# Patient Record
Sex: MALE | Race: WHITE | Employment: UNEMPLOYED | URBAN - METROPOLITAN AREA
[De-identification: names, ages, dates, MRNs, and addresses within clinical notes are randomized per-mention and may not be internally consistent; named-entity substitution may affect disease eponyms.]

---

## 2017-01-01 ENCOUNTER — OFFICE VISIT (OUTPATIENT)
Dept: ENT CLINIC | Age: 67
End: 2017-01-01

## 2017-01-01 ENCOUNTER — TELEPHONE (OUTPATIENT)
Dept: ENT CLINIC | Age: 67
End: 2017-01-01

## 2017-01-01 ENCOUNTER — HOSPITAL ENCOUNTER (OUTPATIENT)
Dept: SURGERY | Age: 67
Discharge: HOME | End: 2017-06-12
Attending: OTOLARYNGOLOGY | Admitting: OTOLARYNGOLOGY

## 2017-01-01 ENCOUNTER — HOSPITAL ENCOUNTER (OUTPATIENT)
Dept: CT IMAGING | Age: 67
Discharge: HOME | End: 2017-11-29
Admitting: OTOLARYNGOLOGY

## 2017-01-01 ENCOUNTER — HOSPITAL ENCOUNTER (OUTPATIENT)
Dept: OTHER | Age: 67
Discharge: HOME | End: 2017-12-13
Attending: INTERNAL MEDICINE | Admitting: INTERNAL MEDICINE

## 2017-01-01 ENCOUNTER — PROCEDURE VISIT (OUTPATIENT)
Dept: VASCULAR SURGERY | Age: 67
End: 2017-01-01

## 2017-01-01 ENCOUNTER — TELEPHONE (OUTPATIENT)
Dept: CARDIOTHORACIC SURGERY | Age: 67
End: 2017-01-01

## 2017-01-01 VITALS
HEIGHT: 66 IN | HEART RATE: 72 BPM | BODY MASS INDEX: 21.21 KG/M2 | DIASTOLIC BLOOD PRESSURE: 56 MMHG | WEIGHT: 132 LBS | SYSTOLIC BLOOD PRESSURE: 104 MMHG

## 2017-01-01 VITALS
WEIGHT: 142.19 LBS | BODY MASS INDEX: 22.32 KG/M2 | HEART RATE: 75 BPM | RESPIRATION RATE: 16 BRPM | SYSTOLIC BLOOD PRESSURE: 118 MMHG | OXYGEN SATURATION: 97 % | TEMPERATURE: 98 F | DIASTOLIC BLOOD PRESSURE: 72 MMHG | HEIGHT: 67 IN

## 2017-01-01 VITALS — BODY MASS INDEX: 22.29 KG/M2 | WEIGHT: 142 LBS | HEIGHT: 67 IN

## 2017-01-01 VITALS
HEIGHT: 67 IN | HEART RATE: 57 BPM | DIASTOLIC BLOOD PRESSURE: 63 MMHG | WEIGHT: 144.8 LBS | BODY MASS INDEX: 22.73 KG/M2 | SYSTOLIC BLOOD PRESSURE: 112 MMHG

## 2017-01-01 VITALS — HEIGHT: 67 IN | WEIGHT: 144 LBS | BODY MASS INDEX: 22.6 KG/M2

## 2017-01-01 DIAGNOSIS — C44.329 SQUAMOUS CELL CARCINOMA OF SKIN OF CHEEK: Primary | ICD-10-CM

## 2017-01-01 DIAGNOSIS — R63.4 WEIGHT LOSS, NON-INTENTIONAL: ICD-10-CM

## 2017-01-01 DIAGNOSIS — I65.23 BILATERAL CAROTID ARTERY STENOSIS: Primary | ICD-10-CM

## 2017-01-01 DIAGNOSIS — R63.4 ABNORMAL WEIGHT LOSS: ICD-10-CM

## 2017-01-01 DIAGNOSIS — K90.9 MALABSORPTION OF IRON: ICD-10-CM

## 2017-01-01 DIAGNOSIS — R13.12 OROPHARYNGEAL DYSPHAGIA: Primary | ICD-10-CM

## 2017-01-01 DIAGNOSIS — C44.90 PRIMARY SKIN MALIGNANCY: Primary | ICD-10-CM

## 2017-01-01 DIAGNOSIS — R13.12 OROPHARYNGEAL DYSPHAGIA: ICD-10-CM

## 2017-01-01 DIAGNOSIS — T45.1X5A ANTINEOPLASTIC CHEMOTHERAPY INDUCED ANEMIA: ICD-10-CM

## 2017-01-01 DIAGNOSIS — C44.90 PRIMARY SKIN MALIGNANCY: ICD-10-CM

## 2017-01-01 DIAGNOSIS — D64.81 ANTINEOPLASTIC CHEMOTHERAPY INDUCED ANEMIA: ICD-10-CM

## 2017-01-01 LAB
ABO/RH: NORMAL
ABO/RH: NORMAL
ANION GAP SERPL CALCULATED.3IONS-SCNC: 8 MMOL/L (ref 3–16)
ANISOCYTOSIS: ABNORMAL
ANTIBODY IDENTIFICATION: NORMAL
ANTIBODY SCREEN: NORMAL
ANTIBODY SCREEN: NORMAL
BASOPHILS ABSOLUTE: 0 K/UL (ref 0–0.2)
BASOPHILS RELATIVE PERCENT: 0.5 %
BLOOD BANK DISPENSE STATUS: NORMAL
BLOOD BANK PRODUCT CODE: NORMAL
BPU ID: NORMAL
BUN BLDV-MCNC: 13 MG/DL (ref 7–20)
CALCIUM SERPL-MCNC: 9.3 MG/DL (ref 8.3–10.6)
CHLORIDE BLD-SCNC: 101 MMOL/L (ref 99–110)
CO2: 30 MMOL/L (ref 21–32)
CREAT SERPL-MCNC: 0.7 MG/DL (ref 0.8–1.3)
DAT IGG CAPTURE: NORMAL
DESCRIPTION BLOOD BANK: NORMAL
EOSINOPHILS ABSOLUTE: 0.1 K/UL (ref 0–0.6)
EOSINOPHILS RELATIVE PERCENT: 3 %
GFR AFRICAN AMERICAN: >60
GFR NON-AFRICAN AMERICAN: >60
GLUCOSE BLD-MCNC: 135 MG/DL (ref 70–99)
GLUCOSE BLD-MCNC: 97 MG/DL (ref 70–99)
HCT VFR BLD CALC: 31.2 % (ref 40.5–52.5)
HCT VFR BLD CALC: 32.7 % (ref 40.5–52.5)
HEMATOLOGY PATH CONSULT: NORMAL
HEMATOLOGY PATH CONSULT: NORMAL
HEMATOLOGY PATH CONSULT: YES
HEMOGLOBIN: 10.6 G/DL (ref 13.5–17.5)
HEMOGLOBIN: 11 G/DL (ref 13.5–17.5)
INR BLD: 1.04 (ref 0.85–1.15)
LYMPHOCYTES ABSOLUTE: 0.1 K/UL (ref 1–5.1)
LYMPHOCYTES RELATIVE PERCENT: 3.2 %
MACROCYTES: ABNORMAL
MCH RBC QN AUTO: 37.8 PG (ref 26–34)
MCH RBC QN AUTO: 39 PG (ref 26–34)
MCHC RBC AUTO-ENTMCNC: 33.7 G/DL (ref 31–36)
MCHC RBC AUTO-ENTMCNC: 33.9 G/DL (ref 31–36)
MCV RBC AUTO: 112.3 FL (ref 80–100)
MCV RBC AUTO: 114.9 FL (ref 80–100)
MONOCYTES ABSOLUTE: 0.2 K/UL (ref 0–1.3)
MONOCYTES RELATIVE PERCENT: 6.3 %
NEUTROPHILS ABSOLUTE: 3 K/UL (ref 1.7–7.7)
NEUTROPHILS RELATIVE PERCENT: 87 %
OVALOCYTES: ABNORMAL
PDW BLD-RTO: 16.6 % (ref 12.4–15.4)
PDW BLD-RTO: 18.1 % (ref 12.4–15.4)
PERFORMED ON: ABNORMAL
PLATELET # BLD: 67 K/UL (ref 135–450)
PLATELET # BLD: 88 K/UL (ref 135–450)
PLATELET SLIDE REVIEW: ABNORMAL
PMV BLD AUTO: 8 FL (ref 5–10.5)
PMV BLD AUTO: 9 FL (ref 5–10.5)
POTASSIUM SERPL-SCNC: 4.3 MMOL/L (ref 3.5–5.1)
PROTHROMBIN TIME: 11.8 SEC (ref 9.6–13)
RBC # BLD: 2.71 M/UL (ref 4.2–5.9)
RBC # BLD: 2.91 M/UL (ref 4.2–5.9)
SLIDE REVIEW: ABNORMAL
SODIUM BLD-SCNC: 139 MMOL/L (ref 136–145)
TEAR DROP CELLS: ABNORMAL
WBC # BLD: 2.4 K/UL (ref 4–11)
WBC # BLD: 3.5 K/UL (ref 4–11)

## 2017-01-01 PROCEDURE — 14040 TIS TRNFR F/C/C/M/N/A/G/H/F: CPT | Performed by: OTOLARYNGOLOGY

## 2017-01-01 PROCEDURE — 99024 POSTOP FOLLOW-UP VISIT: CPT | Performed by: OTOLARYNGOLOGY

## 2017-01-01 PROCEDURE — 93880 EXTRACRANIAL BILAT STUDY: CPT | Performed by: SURGERY

## 2017-01-01 PROCEDURE — 43450 DILATE ESOPHAGUS 1/MULT PASS: CPT | Performed by: OTOLARYNGOLOGY

## 2017-01-01 PROCEDURE — 99213 OFFICE O/P EST LOW 20 MIN: CPT | Performed by: OTOLARYNGOLOGY

## 2017-01-01 RX ORDER — SODIUM CHLORIDE 0.9 % (FLUSH) 0.9 %
10 SYRINGE (ML) INJECTION EVERY 12 HOURS SCHEDULED
Status: DISCONTINUED | OUTPATIENT
Start: 2017-01-01 | End: 2017-01-01 | Stop reason: HOSPADM

## 2017-01-01 RX ORDER — SODIUM CHLORIDE, SODIUM LACTATE, POTASSIUM CHLORIDE, CALCIUM CHLORIDE 600; 310; 30; 20 MG/100ML; MG/100ML; MG/100ML; MG/100ML
INJECTION, SOLUTION INTRAVENOUS CONTINUOUS
Status: DISCONTINUED | OUTPATIENT
Start: 2017-01-01 | End: 2017-01-01 | Stop reason: HOSPADM

## 2017-01-01 RX ORDER — MEPERIDINE HYDROCHLORIDE 25 MG/ML
12.5 INJECTION INTRAMUSCULAR; INTRAVENOUS; SUBCUTANEOUS EVERY 5 MIN PRN
Status: DISCONTINUED | OUTPATIENT
Start: 2017-01-01 | End: 2017-01-01 | Stop reason: HOSPADM

## 2017-01-01 RX ORDER — HYDROMORPHONE HCL 110MG/55ML
0.25 PATIENT CONTROLLED ANALGESIA SYRINGE INTRAVENOUS EVERY 5 MIN PRN
Status: DISCONTINUED | OUTPATIENT
Start: 2017-01-01 | End: 2017-01-01 | Stop reason: HOSPADM

## 2017-01-01 RX ORDER — ONDANSETRON 2 MG/ML
4 INJECTION INTRAMUSCULAR; INTRAVENOUS EVERY 6 HOURS PRN
Status: DISCONTINUED | OUTPATIENT
Start: 2017-01-01 | End: 2017-01-01 | Stop reason: HOSPADM

## 2017-01-01 RX ORDER — ONDANSETRON 2 MG/ML
4 INJECTION INTRAMUSCULAR; INTRAVENOUS
Status: ACTIVE | OUTPATIENT
Start: 2017-01-01 | End: 2017-01-01

## 2017-01-01 RX ORDER — SODIUM CHLORIDE 0.9 % (FLUSH) 0.9 %
10 SYRINGE (ML) INJECTION PRN
Status: DISCONTINUED | OUTPATIENT
Start: 2017-01-01 | End: 2017-01-01 | Stop reason: HOSPADM

## 2017-01-01 RX ORDER — TRAMADOL HYDROCHLORIDE 50 MG/1
50 TABLET ORAL EVERY 6 HOURS PRN
Qty: 15 TABLET | Refills: 0 | Status: SHIPPED | OUTPATIENT
Start: 2017-01-01 | End: 2017-01-01 | Stop reason: ALTCHOICE

## 2017-01-01 RX ORDER — HYDROMORPHONE HCL 110MG/55ML
0.5 PATIENT CONTROLLED ANALGESIA SYRINGE INTRAVENOUS EVERY 5 MIN PRN
Status: DISCONTINUED | OUTPATIENT
Start: 2017-01-01 | End: 2017-01-01 | Stop reason: HOSPADM

## 2017-01-01 RX ORDER — SODIUM CHLORIDE 9 MG/ML
INJECTION, SOLUTION INTRAVENOUS CONTINUOUS
Status: DISCONTINUED | OUTPATIENT
Start: 2017-01-01 | End: 2017-01-01 | Stop reason: HOSPADM

## 2017-01-01 RX ORDER — LIDOCAINE HYDROCHLORIDE 10 MG/ML
1 INJECTION, SOLUTION EPIDURAL; INFILTRATION; INTRACAUDAL; PERINEURAL
Status: ACTIVE | OUTPATIENT
Start: 2017-01-01 | End: 2017-01-01

## 2017-01-01 RX ORDER — LIDOCAINE HYDROCHLORIDE 10 MG/ML
0.5 INJECTION, SOLUTION EPIDURAL; INFILTRATION; INTRACAUDAL; PERINEURAL ONCE
Status: DISCONTINUED | OUTPATIENT
Start: 2017-01-01 | End: 2017-01-01 | Stop reason: HOSPADM

## 2017-01-01 RX ORDER — ACETAMINOPHEN 325 MG/1
TABLET ORAL
Status: COMPLETED
Start: 2017-01-01 | End: 2017-01-01

## 2017-01-01 RX ORDER — ACETAMINOPHEN 325 MG/1
650 TABLET ORAL EVERY 4 HOURS PRN
Status: DISCONTINUED | OUTPATIENT
Start: 2017-01-01 | End: 2017-01-01 | Stop reason: HOSPADM

## 2017-01-01 RX ORDER — CEFDINIR 300 MG/1
300 CAPSULE ORAL 2 TIMES DAILY
Qty: 14 CAPSULE | Refills: 0 | Status: SHIPPED | OUTPATIENT
Start: 2017-01-01 | End: 2017-01-01 | Stop reason: ALTCHOICE

## 2017-01-01 RX ADMIN — SODIUM CHLORIDE: 9 INJECTION, SOLUTION INTRAVENOUS at 12:01

## 2017-01-01 RX ADMIN — ACETAMINOPHEN 650 MG: 325 TABLET ORAL at 12:08

## 2017-01-01 RX ADMIN — SODIUM CHLORIDE: 9 INJECTION, SOLUTION INTRAVENOUS at 07:00

## 2017-01-01 ASSESSMENT — PAIN SCALES - GENERAL: PAINLEVEL_OUTOF10: 3

## 2017-01-01 ASSESSMENT — PAIN - FUNCTIONAL ASSESSMENT
PAIN_FUNCTIONAL_ASSESSMENT: 0-10
PAIN_FUNCTIONAL_ASSESSMENT: 0-10

## 2017-01-01 ASSESSMENT — PAIN DESCRIPTION - DESCRIPTORS: DESCRIPTORS: ACHING

## 2017-01-01 ASSESSMENT — PAIN DESCRIPTION - LOCATION: LOCATION: HEAD

## 2017-04-14 PROBLEM — D64.81 ANTINEOPLASTIC CHEMOTHERAPY INDUCED ANEMIA: Status: ACTIVE | Noted: 2017-04-14

## 2017-04-14 PROBLEM — T45.1X5A ANTINEOPLASTIC CHEMOTHERAPY INDUCED ANEMIA: Status: ACTIVE | Noted: 2017-04-14

## 2017-04-20 PROBLEM — K90.9 MALABSORPTION OF IRON: Status: ACTIVE | Noted: 2017-04-20

## 2017-04-20 PROBLEM — D50.0 IRON DEFICIENCY ANEMIA DUE TO CHRONIC BLOOD LOSS: Status: ACTIVE | Noted: 2017-04-20

## 2017-04-20 PROBLEM — G70.01 MYASTHENIA GRAVIS WITH EXACERBATION (HCC): Status: ACTIVE | Noted: 2017-04-20

## 2017-04-20 PROBLEM — G70.00 MYASTHENIA GRAVIS (HCC): Status: ACTIVE | Noted: 2017-04-20

## 2017-05-01 PROBLEM — D63.8 ANEMIA OF OTHER CHRONIC DISEASE: Status: ACTIVE | Noted: 2017-05-01

## 2017-05-30 ENCOUNTER — OFFICE VISIT (OUTPATIENT)
Dept: ENT CLINIC | Age: 67
End: 2017-05-30

## 2017-05-30 VITALS
BODY MASS INDEX: 22.91 KG/M2 | DIASTOLIC BLOOD PRESSURE: 61 MMHG | HEART RATE: 66 BPM | WEIGHT: 146 LBS | HEIGHT: 67 IN | SYSTOLIC BLOOD PRESSURE: 115 MMHG

## 2017-05-30 DIAGNOSIS — R13.10 DYSPHAGIA, UNSPECIFIED TYPE: ICD-10-CM

## 2017-05-30 DIAGNOSIS — Z85.89 HISTORY OF CANCER OF HEAD OR NECK: Primary | ICD-10-CM

## 2017-05-30 DIAGNOSIS — L98.9 LESION OF SKIN OF FACE: ICD-10-CM

## 2017-05-30 DIAGNOSIS — G70.01 MYASTHENIA GRAVIS WITH EXACERBATION (HCC): ICD-10-CM

## 2017-05-30 PROCEDURE — 99213 OFFICE O/P EST LOW 20 MIN: CPT | Performed by: OTOLARYNGOLOGY

## 2017-05-30 RX ORDER — QUETIAPINE FUMARATE 25 MG/1
25 TABLET, FILM COATED ORAL 2 TIMES DAILY
COMMUNITY
End: 2017-06-07

## 2017-06-07 ENCOUNTER — TELEPHONE (OUTPATIENT)
Dept: ENT CLINIC | Age: 67
End: 2017-06-07

## 2017-06-12 PROBLEM — C44.90: Status: ACTIVE | Noted: 2017-01-01

## 2017-06-19 PROBLEM — C44.90: Status: RESOLVED | Noted: 2017-01-01 | Resolved: 2017-01-01

## 2017-06-19 PROBLEM — C44.329 SQUAMOUS CELL CARCINOMA OF SKIN OF CHEEK: Status: ACTIVE | Noted: 2017-01-01

## 2017-06-29 PROBLEM — Z51.89 TREATMENT: Status: ACTIVE | Noted: 2017-01-01

## 2017-10-04 NOTE — TELEPHONE ENCOUNTER
Called and spoke with Martha Bruno, spouse of patient with results of carotid duplex scan. Scan is good per Dr Edmundo Merritt. Repeat in 1 year. pamlr

## 2017-11-28 NOTE — PROGRESS NOTES
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 It was treated by radical neck dissection and full course radiation and at no time in the last 30 years has a primary source emerged. Through the years he has required intermittent dilatations and is generally been able to swallow following these procedures. There has been. dysphasia documented as far back as 2013 and he has been seeing a swallowing specialist since that time. He did relatively well this regard in until March of this year when he was hospitalized at the [facility] with aspiration pneumonia area. He underwent a right middle lobectomy for consolidation of the right lobe thought to be secondary to aspiration. Although an alternative feeding tube was discussed at that time, this was never pursued. He informs me that he underwent an endoscopy both upper and lower by his gastroenterologist in April of this year, with no abnormal findings to pursue. There have been suspicion for reflux, but this has all his been treated medically    Today she presents with increasing difficulty swallowing. Solid food simply will not traverse into the thoracic esophagus. He is easily choking with water. Thick liquids and puréed are manageable. He is losing weight however. 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 He was reconstructed with an advancement rotation flap, and was done very well in this regard       The patient's voice is strong and he is breathing easily. There were thick inspissated secretions and mucus that had to be evacuated from his oropharynx and hypopharynx in order to examine his throat.   There is nonspecific swelling seen on mirror exam in the post cricoid area but no discrete

## 2017-11-29 NOTE — TELEPHONE ENCOUNTER
Received call from Padma for results on patient's Esophogram.    \"Significant amount of aspiration of thick barium\".

## 2017-12-01 NOTE — TELEPHONE ENCOUNTER
Review studies with patient  No findings on CT neck  Early aspiration on swallow  This was seen before and has been evaluated by swallowing therapists    We discussed the feeding tube  The family will decide if they want to pursue this at [facility] or return to their gastroenterologist [place]  An upper airway endoscopy may be beneficial the time of the placement of a PEG tube

## 2017-12-01 NOTE — TELEPHONE ENCOUNTER
The patients wife called for results of the barium swallow test and CT please advise. I see the results from the swallow study just needing the results from the CT thanks. [phone].

## 2017-12-05 NOTE — TELEPHONE ENCOUNTER
[facility] ([phone]) () from [address], Dr. Bruce Willard,  called and wants to know when it will be a good time to put feeding tube in patient? Per Telephone encounter 12/1/17  \"An upper airway endoscopy may be beneficial the time of the placement of a PEG tube\"    I told them you were off this week. Do you have to be there for the endoscopy? Please advise?

## 2017-12-15 PROBLEM — Z93.1 S/P PERCUTANEOUS ENDOSCOPIC GASTROSTOMY (PEG) TUBE PLACEMENT (HCC): Status: ACTIVE | Noted: 2017-01-01

## 2017-12-17 PROBLEM — Z43.1 PEG (PERCUTANEOUS ENDOSCOPIC GASTROSTOMY) ADJUSTMENT/REPLACEMENT/REMOVAL (HCC): Status: ACTIVE | Noted: 2017-01-01

## 2018-01-01 ENCOUNTER — OFFICE VISIT (OUTPATIENT)
Dept: ENT CLINIC | Age: 68
End: 2018-01-01

## 2018-01-01 ENCOUNTER — HOSPITAL ENCOUNTER (OUTPATIENT)
Dept: SURGERY | Age: 68
Discharge: HOME | End: 2018-01-15
Attending: OTOLARYNGOLOGY | Admitting: OTOLARYNGOLOGY

## 2018-01-01 ENCOUNTER — TELEPHONE (OUTPATIENT)
Dept: ENT CLINIC | Age: 68
End: 2018-01-01

## 2018-01-01 ENCOUNTER — HOSPITAL ENCOUNTER (OUTPATIENT)
Dept: NON INVASIVE DIAGNOSTICS | Age: 68
Discharge: HOME | End: 2018-05-09
Attending: PHYSICIAN ASSISTANT | Admitting: PHYSICIAN ASSISTANT

## 2018-01-01 ENCOUNTER — HOSPITAL ENCOUNTER (OUTPATIENT)
Dept: SURGERY | Age: 68
Discharge: HOME | End: 2018-04-11
Attending: OTOLARYNGOLOGY | Admitting: OTOLARYNGOLOGY

## 2018-01-01 ENCOUNTER — HOSPITAL ENCOUNTER (OUTPATIENT)
Dept: MRI IMAGING | Age: 68
Discharge: HOME | End: 2018-05-14
Attending: INTERNAL MEDICINE | Admitting: INTERNAL MEDICINE

## 2018-01-01 ENCOUNTER — HOSPITAL ENCOUNTER (OUTPATIENT)
Dept: ONCOLOGY | Age: 68
Discharge: HOME | End: 2018-05-19
Attending: INTERNAL MEDICINE | Admitting: INTERNAL MEDICINE

## 2018-01-01 ENCOUNTER — HOSPITAL ENCOUNTER (OUTPATIENT)
Dept: ONCOLOGY | Age: 68
Discharge: HOME | End: 2018-06-30
Attending: INTERNAL MEDICINE | Admitting: INTERNAL MEDICINE

## 2018-01-01 ENCOUNTER — HOSPITAL ENCOUNTER (OUTPATIENT)
Dept: CARDIAC CATH/INVASIVE PROCEDURES | Age: 68
Discharge: HOME | End: 2018-05-04
Admitting: INTERNAL MEDICINE

## 2018-01-01 ENCOUNTER — HOSPITAL ENCOUNTER (OUTPATIENT)
Dept: ONCOLOGY | Age: 68
Discharge: HOME | End: 2018-05-31
Attending: INTERNAL MEDICINE | Admitting: INTERNAL MEDICINE

## 2018-01-01 ENCOUNTER — HOSPITAL ENCOUNTER (OUTPATIENT)
Dept: CARDIAC CATH/INVASIVE PROCEDURES | Age: 68
Discharge: HOME | End: 2018-05-10
Attending: INTERNAL MEDICINE | Admitting: INTERNAL MEDICINE

## 2018-01-01 ENCOUNTER — HOSPITAL ENCOUNTER (OUTPATIENT)
Dept: ONCOLOGY | Age: 68
Discharge: HOME | End: 2018-05-24
Attending: INTERNAL MEDICINE | Admitting: INTERNAL MEDICINE

## 2018-01-01 VITALS
HEIGHT: 66 IN | HEART RATE: 79 BPM | WEIGHT: 153 LBS | OXYGEN SATURATION: 97 % | DIASTOLIC BLOOD PRESSURE: 64 MMHG | SYSTOLIC BLOOD PRESSURE: 125 MMHG | BODY MASS INDEX: 24.59 KG/M2 | TEMPERATURE: 98.6 F | RESPIRATION RATE: 15 BRPM

## 2018-01-01 VITALS
RESPIRATION RATE: 18 BRPM | SYSTOLIC BLOOD PRESSURE: 115 MMHG | TEMPERATURE: 97.8 F | DIASTOLIC BLOOD PRESSURE: 69 MMHG | HEART RATE: 72 BPM

## 2018-01-01 VITALS — SYSTOLIC BLOOD PRESSURE: 100 MMHG | DIASTOLIC BLOOD PRESSURE: 54 MMHG | HEART RATE: 64 BPM

## 2018-01-01 VITALS
WEIGHT: 140.8 LBS | DIASTOLIC BLOOD PRESSURE: 65 MMHG | HEIGHT: 66 IN | BODY MASS INDEX: 22.63 KG/M2 | SYSTOLIC BLOOD PRESSURE: 99 MMHG | RESPIRATION RATE: 16 BRPM | OXYGEN SATURATION: 97 % | HEART RATE: 65 BPM | TEMPERATURE: 97.5 F

## 2018-01-01 VITALS
SYSTOLIC BLOOD PRESSURE: 118 MMHG | RESPIRATION RATE: 18 BRPM | DIASTOLIC BLOOD PRESSURE: 73 MMHG | HEART RATE: 70 BPM | TEMPERATURE: 97.4 F

## 2018-01-01 VITALS — HEART RATE: 64 BPM | SYSTOLIC BLOOD PRESSURE: 90 MMHG | DIASTOLIC BLOOD PRESSURE: 58 MMHG

## 2018-01-01 VITALS — DIASTOLIC BLOOD PRESSURE: 70 MMHG | HEART RATE: 68 BPM | SYSTOLIC BLOOD PRESSURE: 118 MMHG

## 2018-01-01 VITALS — DIASTOLIC BLOOD PRESSURE: 58 MMHG | HEART RATE: 72 BPM | SYSTOLIC BLOOD PRESSURE: 108 MMHG

## 2018-01-01 VITALS — HEART RATE: 64 BPM | SYSTOLIC BLOOD PRESSURE: 110 MMHG | DIASTOLIC BLOOD PRESSURE: 64 MMHG

## 2018-01-01 VITALS — DIASTOLIC BLOOD PRESSURE: 50 MMHG | SYSTOLIC BLOOD PRESSURE: 102 MMHG | HEART RATE: 60 BPM

## 2018-01-01 VITALS
DIASTOLIC BLOOD PRESSURE: 59 MMHG | SYSTOLIC BLOOD PRESSURE: 97 MMHG | RESPIRATION RATE: 16 BRPM | TEMPERATURE: 97.6 F | HEART RATE: 63 BPM

## 2018-01-01 DIAGNOSIS — C83.31 DIFFUSE LARGE B-CELL LYMPHOMA, LYMPH NODES OF HEAD, FACE, AND NECK (HCC): ICD-10-CM

## 2018-01-01 DIAGNOSIS — D69.6 THROMBOCYTOPENIA (HCC): ICD-10-CM

## 2018-01-01 DIAGNOSIS — K14.8 MASS OF TONGUE: ICD-10-CM

## 2018-01-01 DIAGNOSIS — K14.8 TONGUE MASS: ICD-10-CM

## 2018-01-01 DIAGNOSIS — C83.31 DIFFUSE HISTIOCYTIC NODULAR LYMPHOMA OF HEAD OR NECK (HCC): ICD-10-CM

## 2018-01-01 DIAGNOSIS — C44.41 BASAL CELL CARCINOMA OF LEFT SIDE OF NECK: Primary | ICD-10-CM

## 2018-01-01 DIAGNOSIS — C85.99: Primary | ICD-10-CM

## 2018-01-01 DIAGNOSIS — C44.329 SQUAMOUS CELL CARCINOMA OF SKIN OF CHEEK: Primary | ICD-10-CM

## 2018-01-01 DIAGNOSIS — T45.1X5A ANTINEOPLASTIC CHEMOTHERAPY INDUCED ANEMIA: Primary | ICD-10-CM

## 2018-01-01 DIAGNOSIS — C44.41 BASAL CELL CARCINOMA OF LEFT SIDE OF NECK: ICD-10-CM

## 2018-01-01 DIAGNOSIS — D64.81 ANTINEOPLASTIC CHEMOTHERAPY INDUCED ANEMIA: Primary | ICD-10-CM

## 2018-01-01 LAB
ABO/RH: NORMAL
ABO/RH: NORMAL
ANION GAP SERPL CALCULATED.3IONS-SCNC: 8 MMOL/L (ref 3–16)
ANTIBODY IDENTIFICATION: NORMAL
ANTIBODY SCREEN: NORMAL
APPEARANCE CSF: CLEAR
BLOOD BANK DISPENSE STATUS: NORMAL
BLOOD BANK PRODUCT CODE: NORMAL
BPU ID: NORMAL
BUN BLDV-MCNC: 20 MG/DL (ref 7–20)
CALCIUM SERPL-MCNC: 9.2 MG/DL (ref 8.3–10.6)
CHLORIDE BLD-SCNC: 99 MMOL/L (ref 99–110)
CLOT EVALUATION CSF: NORMAL
CO2: 31 MMOL/L (ref 21–32)
COLOR CSF: COLORLESS
CREAT SERPL-MCNC: 0.5 MG/DL (ref 0.8–1.3)
CSF CULTURE: NORMAL
DAT IGG CAPTURE: NORMAL
DESCRIPTION BLOOD BANK: NORMAL
EKG ATRIAL RATE: 66 BPM
EKG DIAGNOSIS: NORMAL
EKG P AXIS: 43 DEGREES
EKG P-R INTERVAL: 196 MS
EKG Q-T INTERVAL: 434 MS
EKG QRS DURATION: 86 MS
EKG QTC CALCULATION (BAZETT): 454 MS
EKG R AXIS: -18 DEGREES
EKG T AXIS: 31 DEGREES
EKG VENTRICULAR RATE: 66 BPM
GFR AFRICAN AMERICAN: >60
GFR NON-AFRICAN AMERICAN: >60
GLUCOSE BLD-MCNC: 128 MG/DL (ref 70–99)
GLUCOSE BLD-MCNC: 88 MG/DL (ref 70–99)
GLUCOSE BLD-MCNC: 90 MG/DL (ref 70–99)
GLUCOSE BLD-MCNC: 95 MG/DL (ref 70–99)
GLUCOSE, CSF: 60 MG/DL (ref 40–80)
GRAM STAIN RESULT: ABNORMAL
GRAM STAIN RESULT: NORMAL
HCT VFR BLD CALC: 34.4 % (ref 40.5–52.5)
HCT VFR BLD CALC: 38 % (ref 40.5–52.5)
HEMOGLOBIN: 11.9 G/DL (ref 13.5–17.5)
HEMOGLOBIN: 13.2 G/DL (ref 13.5–17.5)
INR BLD: 1.13 (ref 0.85–1.15)
LV EF: 58 %
LVEF MODALITY: NORMAL
MCH RBC QN AUTO: 38.6 PG (ref 26–34)
MCH RBC QN AUTO: 39 PG (ref 26–34)
MCHC RBC AUTO-ENTMCNC: 34.7 G/DL (ref 31–36)
MCHC RBC AUTO-ENTMCNC: 34.8 G/DL (ref 31–36)
MCV RBC AUTO: 111.1 FL (ref 80–100)
MCV RBC AUTO: 112.6 FL (ref 80–100)
NO DIFFERENTIAL CSF: NORMAL
ORGANISM: ABNORMAL
PDW BLD-RTO: 15.1 % (ref 12.4–15.4)
PDW BLD-RTO: 16.1 % (ref 12.4–15.4)
PERFORMED ON: ABNORMAL
PERFORMED ON: NORMAL
PERFORMED ON: NORMAL
PLATELET # BLD: 39 K/UL (ref 135–450)
PLATELET # BLD: 41 K/UL (ref 135–450)
PLATELET # BLD: 63 K/UL (ref 135–450)
PLATELET SLIDE REVIEW: ABNORMAL
PMV BLD AUTO: 7.6 FL (ref 5–10.5)
PMV BLD AUTO: 8.2 FL (ref 5–10.5)
POTASSIUM SERPL-SCNC: 4.3 MMOL/L (ref 3.5–5.1)
PROTEIN CSF: 28 MG/DL (ref 15–45)
PROTHROMBIN TIME: 12.8 SEC (ref 9.6–13)
RBC # BLD: 3.05 M/UL (ref 4.2–5.9)
RBC # BLD: 3.42 M/UL (ref 4.2–5.9)
RBC CSF: 0 /CUMM
SLIDE REVIEW: ABNORMAL
SODIUM BLD-SCNC: 138 MMOL/L (ref 136–145)
TUBE NUMBER CSF: NORMAL
WBC # BLD: 3 K/UL (ref 4–11)
WBC # BLD: 3.3 K/UL (ref 4–11)
WBC CSF: 1 /CUMM (ref 0–5)
WOUND/ABSCESS: ABNORMAL
WOUND/ABSCESS: ABNORMAL

## 2018-01-01 PROCEDURE — 99024 POSTOP FOLLOW-UP VISIT: CPT | Performed by: OTOLARYNGOLOGY

## 2018-01-01 PROCEDURE — 41105 BIOPSY OF TONGUE: CPT | Performed by: OTOLARYNGOLOGY

## 2018-01-01 PROCEDURE — 11626 EXC S/N/H/F/G MAL+MRG >4 CM: CPT | Performed by: OTOLARYNGOLOGY

## 2018-01-01 PROCEDURE — 31525 DX LARYNGOSCOPY EXCL NB: CPT | Performed by: OTOLARYNGOLOGY

## 2018-01-01 PROCEDURE — 99212 OFFICE O/P EST SF 10 MIN: CPT | Performed by: OTOLARYNGOLOGY

## 2018-01-01 PROCEDURE — 99213 OFFICE O/P EST LOW 20 MIN: CPT | Performed by: OTOLARYNGOLOGY

## 2018-01-01 PROCEDURE — 93010 ELECTROCARDIOGRAM REPORT: CPT | Performed by: INTERNAL MEDICINE

## 2018-01-01 RX ORDER — SODIUM CHLORIDE 9 MG/ML
INJECTION, SOLUTION INTRAVENOUS CONTINUOUS
Status: DISCONTINUED | OUTPATIENT
Start: 2018-01-01 | End: 2018-01-01 | Stop reason: HOSPADM

## 2018-01-01 RX ORDER — SODIUM CHLORIDE 0.9 % (FLUSH) 0.9 %
10 SYRINGE (ML) INJECTION EVERY 12 HOURS SCHEDULED
Status: DISCONTINUED | OUTPATIENT
Start: 2018-01-01 | End: 2018-01-01 | Stop reason: HOSPADM

## 2018-01-01 RX ORDER — FENTANYL CITRATE 50 UG/ML
50 INJECTION, SOLUTION INTRAMUSCULAR; INTRAVENOUS EVERY 5 MIN PRN
Status: DISCONTINUED | OUTPATIENT
Start: 2018-01-01 | End: 2018-01-01 | Stop reason: HOSPADM

## 2018-01-01 RX ORDER — 0.9 % SODIUM CHLORIDE 0.9 %
10 VIAL (ML) INJECTION ONCE
Status: CANCELLED | OUTPATIENT
Start: 2018-01-01 | End: 2018-01-01

## 2018-01-01 RX ORDER — ACETAMINOPHEN 325 MG/1
650 TABLET ORAL EVERY 4 HOURS PRN
Status: DISCONTINUED | OUTPATIENT
Start: 2018-01-01 | End: 2018-01-01 | Stop reason: HOSPADM

## 2018-01-01 RX ORDER — SODIUM CHLORIDE 0.9 % (FLUSH) 0.9 %
20 SYRINGE (ML) INJECTION PRN
Status: CANCELLED | OUTPATIENT
Start: 2018-01-01

## 2018-01-01 RX ORDER — EPINEPHRINE 1 MG/ML
0.3 INJECTION, SOLUTION, CONCENTRATE INTRAVENOUS PRN
Status: CANCELLED | OUTPATIENT
Start: 2018-01-01

## 2018-01-01 RX ORDER — DIPHENHYDRAMINE HYDROCHLORIDE 50 MG/ML
50 INJECTION INTRAMUSCULAR; INTRAVENOUS ONCE
Status: CANCELLED | OUTPATIENT
Start: 2018-01-01 | End: 2018-01-01

## 2018-01-01 RX ORDER — 0.9 % SODIUM CHLORIDE 0.9 %
250 INTRAVENOUS SOLUTION INTRAVENOUS ONCE
Status: DISCONTINUED | OUTPATIENT
Start: 2018-01-01 | End: 2018-01-01 | Stop reason: HOSPADM

## 2018-01-01 RX ORDER — LABETALOL HYDROCHLORIDE 5 MG/ML
5 INJECTION, SOLUTION INTRAVENOUS EVERY 10 MIN PRN
Status: DISCONTINUED | OUTPATIENT
Start: 2018-01-01 | End: 2018-01-01 | Stop reason: HOSPADM

## 2018-01-01 RX ORDER — OXYCODONE HYDROCHLORIDE 5 MG/1
10 TABLET ORAL PRN
Status: ACTIVE | OUTPATIENT
Start: 2018-01-01 | End: 2018-01-01

## 2018-01-01 RX ORDER — ONDANSETRON 2 MG/ML
4 INJECTION INTRAMUSCULAR; INTRAVENOUS EVERY 6 HOURS PRN
Status: DISCONTINUED | OUTPATIENT
Start: 2018-01-01 | End: 2018-01-01 | Stop reason: HOSPADM

## 2018-01-01 RX ORDER — HYDROMORPHONE HCL 110MG/55ML
0.25 PATIENT CONTROLLED ANALGESIA SYRINGE INTRAVENOUS EVERY 5 MIN PRN
Status: DISCONTINUED | OUTPATIENT
Start: 2018-01-01 | End: 2018-01-01 | Stop reason: HOSPADM

## 2018-01-01 RX ORDER — SODIUM CHLORIDE 9 MG/ML
INJECTION, SOLUTION INTRAVENOUS CONTINUOUS
Status: CANCELLED | OUTPATIENT
Start: 2018-01-01

## 2018-01-01 RX ORDER — SODIUM CHLORIDE 0.9 % (FLUSH) 0.9 %
10 SYRINGE (ML) INJECTION PRN
Status: DISCONTINUED | OUTPATIENT
Start: 2018-01-01 | End: 2018-01-01 | Stop reason: HOSPADM

## 2018-01-01 RX ORDER — PROMETHAZINE HYDROCHLORIDE 25 MG/ML
6.25 INJECTION, SOLUTION INTRAMUSCULAR; INTRAVENOUS PRN
Status: DISCONTINUED | OUTPATIENT
Start: 2018-01-01 | End: 2018-01-01 | Stop reason: HOSPADM

## 2018-01-01 RX ORDER — ONDANSETRON 2 MG/ML
4 INJECTION INTRAMUSCULAR; INTRAVENOUS
Status: ACTIVE | OUTPATIENT
Start: 2018-01-01 | End: 2018-01-01

## 2018-01-01 RX ORDER — MEPERIDINE HYDROCHLORIDE 25 MG/ML
12.5 INJECTION INTRAMUSCULAR; INTRAVENOUS; SUBCUTANEOUS EVERY 5 MIN PRN
Status: DISCONTINUED | OUTPATIENT
Start: 2018-01-01 | End: 2018-01-01 | Stop reason: HOSPADM

## 2018-01-01 RX ORDER — HYDROCODONE BITARTRATE AND ACETAMINOPHEN 5; 325 MG/1; MG/1
1 TABLET ORAL EVERY 6 HOURS PRN
Qty: 10 TABLET | Refills: 0 | Status: SHIPPED | OUTPATIENT
Start: 2018-01-01 | End: 2018-01-01

## 2018-01-01 RX ORDER — SODIUM CHLORIDE 0.9 % (FLUSH) 0.9 %
10 SYRINGE (ML) INJECTION PRN
Status: CANCELLED | OUTPATIENT
Start: 2018-01-01

## 2018-01-01 RX ORDER — METHYLPREDNISOLONE SODIUM SUCCINATE 125 MG/2ML
125 INJECTION, POWDER, LYOPHILIZED, FOR SOLUTION INTRAMUSCULAR; INTRAVENOUS ONCE
Status: CANCELLED | OUTPATIENT
Start: 2018-01-01 | End: 2018-01-01

## 2018-01-01 RX ORDER — TRAMADOL HYDROCHLORIDE 50 MG/1
50 TABLET ORAL EVERY 6 HOURS PRN
Qty: 20 TABLET | Refills: 0 | Status: SHIPPED | OUTPATIENT
Start: 2018-01-01 | End: 2018-01-01

## 2018-01-01 RX ORDER — SODIUM CHLORIDE, SODIUM LACTATE, POTASSIUM CHLORIDE, CALCIUM CHLORIDE 600; 310; 30; 20 MG/100ML; MG/100ML; MG/100ML; MG/100ML
INJECTION, SOLUTION INTRAVENOUS CONTINUOUS
Status: DISCONTINUED | OUTPATIENT
Start: 2018-01-01 | End: 2018-01-01 | Stop reason: HOSPADM

## 2018-01-01 RX ORDER — CEFDINIR 300 MG/1
300 CAPSULE ORAL 2 TIMES DAILY
Qty: 14 CAPSULE | Refills: 0 | Status: SHIPPED | OUTPATIENT
Start: 2018-01-01 | End: 2018-01-01 | Stop reason: ALTCHOICE

## 2018-01-01 RX ORDER — OXYCODONE HYDROCHLORIDE 5 MG/1
5 TABLET ORAL PRN
Status: ACTIVE | OUTPATIENT
Start: 2018-01-01 | End: 2018-01-01

## 2018-01-01 RX ORDER — HYDROMORPHONE HCL 110MG/55ML
0.5 PATIENT CONTROLLED ANALGESIA SYRINGE INTRAVENOUS EVERY 5 MIN PRN
Status: DISCONTINUED | OUTPATIENT
Start: 2018-01-01 | End: 2018-01-01 | Stop reason: HOSPADM

## 2018-01-01 RX ORDER — SODIUM CHLORIDE 9 MG/ML
INJECTION, SOLUTION INTRAVENOUS CONTINUOUS
Status: ACTIVE | OUTPATIENT
Start: 2018-01-01 | End: 2018-01-01

## 2018-01-01 RX ORDER — DIPHENHYDRAMINE HYDROCHLORIDE 50 MG/ML
12.5 INJECTION INTRAMUSCULAR; INTRAVENOUS
Status: ACTIVE | OUTPATIENT
Start: 2018-01-01 | End: 2018-01-01

## 2018-01-01 RX ORDER — HEPARIN SODIUM (PORCINE) LOCK FLUSH IV SOLN 100 UNIT/ML 100 UNIT/ML
500 SOLUTION INTRAVENOUS PRN
Status: CANCELLED | OUTPATIENT
Start: 2018-01-01

## 2018-01-01 RX ORDER — HEPARIN SODIUM (PORCINE) LOCK FLUSH IV SOLN 100 UNIT/ML 100 UNIT/ML
500 SOLUTION INTRAVENOUS PRN
Status: DISPENSED | OUTPATIENT
Start: 2018-01-01 | End: 2018-01-01

## 2018-01-01 RX ORDER — FENTANYL CITRATE 50 UG/ML
25 INJECTION, SOLUTION INTRAMUSCULAR; INTRAVENOUS EVERY 5 MIN PRN
Status: DISCONTINUED | OUTPATIENT
Start: 2018-01-01 | End: 2018-01-01 | Stop reason: HOSPADM

## 2018-01-01 RX ADMIN — SODIUM CHLORIDE: 9 INJECTION, SOLUTION INTRAVENOUS at 12:03

## 2018-01-01 RX ADMIN — HEPARIN SODIUM (PORCINE) LOCK FLUSH IV SOLN 100 UNIT/ML 500 UNITS: 100 SOLUTION at 12:04

## 2018-01-01 RX ADMIN — SODIUM CHLORIDE: 9 INJECTION, SOLUTION INTRAVENOUS at 15:24

## 2018-01-01 ASSESSMENT — PAIN - FUNCTIONAL ASSESSMENT: PAIN_FUNCTIONAL_ASSESSMENT: 0-10

## 2018-01-01 ASSESSMENT — ENCOUNTER SYMPTOMS: SHORTNESS OF BREATH: 0

## 2018-01-15 PROBLEM — C44.41 BASAL CELL CARCINOMA OF LEFT SIDE OF NECK: Status: ACTIVE | Noted: 2018-01-01

## 2018-01-15 NOTE — OP NOTE
[facility]                      [address] [facility], [address]                                 OPERATIVE REPORT    PATIENT NAME: Luis Angel Angeles                       :        1950  MED REC NO:   [id number]                          ROOM:  ACCOUNT NO:   [de-identified]                          ADMIT DATE: 01/15/2018  PROVIDER:     Crystal Sheffield MD    DATE OF PROCEDURE:  01/15/2018        PREOPERATIVE DIAGNOSIS:  Skin lesion, left neck. POSTOPERATIVE DIAGNOSIS:  Aggressive basal cell carcinoma, left neck. OPERATION PERFORMED:  Excision of basal cell carcinoma, left neck with  frozen section control, closure with undermining and primary closure. INDICATIONS:  The patient has a history of radiation to the right neck for  a metastatic squamous cell carcinoma approximately 30 years ago. Several  months ago, he was treated for a squamous cell carcinoma at the right cheek  with a nonhealing, easily bleeding lesion on the left neck, another skin  malignancy was suspected. There was no adenopathy. OPERATIVE PROCEDURE:  The patient was brought to the operating room, placed  in table in supine position. Under intravenous sedation, local anesthesia  was achieved with 5 mL of 1% Xylocaine with 1:100,000 epinephrine. The  neck was prepped with Betadine solution and then the patient then draped  such that the left neck was exposed. There were 2 lesions that were immediately adjacent. In total, the surface  area grossly involved by the naked eye was approximately 3.5 cm. A  rhomboid was initially planned, and then converted to an ellipse given the  lines of natural skin tension and skin mobility. The ellipse was made from  supero-anterior to inferoposterior following the line of skin tension. This was situated at the mid portion of the posterior side of the  sternomastoid muscle. The long axis to short axis ratio measured  approximately 4:1.   It was carried down to the superficial layer of the  superficial cervical fascia. Hemostasis was achieved with the cautery. The patient was maintained on intravenous sedation. There were 2 positive  margins that returned peripherally and one positive margin deeply. Another  half centimeter peripherally was removed. The immediately adjacent deep  fascia was also excised. At this time, the frozen section showed the deep  margin and anterior margin was clear, but the posterior margin remained  positive between the 12 and 3 o'clock position. This was once again taken  back another one-fourth cm and at this time, the margins were negative. The skin was undermined for several centimeters in both directions. Hemostasis was achieved with Bovie cautery. With tension applied from both  anterior and posterior directions, the subcutaneous tissue was approximated  with interrupted buried 3-0 Vicryl simple suture. At the tightest portion  of the wound superiorly, the skin was approximated with interrupted 4-0  nylon simple suture. With more relaxed closure posteroinferior of the skin  was approximated with staples. Prior to final closure, a Penrose drain was  placed inferiorly. It was secured to the skin with 5-0 nylon. The total  defect prior to closure was measured at 9 cm long and 4 cm wide. A dry  sterile pressure dressing was applied and the patient was discharged to  recovery room. He tolerated the procedure well. He will be seen back  tomorrow for drain removal and dressing change.         Grady Ayoub MD    D: 01/15/2018 11:38:33       T: 01/15/2018 12:09:40     [email or web address]  Job#: [id number]     Doc#: [id number]    CC:

## 2018-01-15 NOTE — H&P
Skin cancer, basal cell; Squamous carcinoma; Thyroid disease; and Uses feeding tube. Past Surgical History:   has a past surgical history that includes Lung removal, partial (Right); Neck surgery (1984); Abdominal exploration surgery (8/4/2005); Abdominal aortic aneurysm repair (10/26/2005); Cholecystectomy; Incisional hernia repair (4/23/2007); Mouth surgery; ESOPHAGOSCOPY (4/12/2013); Skin cancer excision (Right, 7/21/2014); ESOPHAGOSCOPY (7/21/2014); Esophagoscopy (10/26/2015); ESOPHAGOSCOPY (07/25/2016); Appendectomy; Esophagoscopy (06/12/2017); Excision of Facial Mass (06/12/2017); and Upper gastrointestinal endoscopy (12/15/2017). Medications:  Current Outpatient Prescriptions on File Prior to Encounter   Medication Sig Dispense Refill    QUEtiapine (SEROQUEL) 25 MG tablet TAKE ONE TABLET BY MOUTH ONCE NIGHTLY      simvastatin (ZOCOR) 40 MG tablet Take 40 mg by mouth nightly      azaTHIOprine (IMURAN) 50 MG tablet Take 75 mg by mouth daily 75 mg in am; 75mg in pm      predniSONE (DELTASONE) 2.5 MG tablet Take 2.5 mg by mouth daily       pyridostigmine (MESTINON) 60 MG tablet Take 60 mg by mouth 4 times daily      metFORMIN (GLUCOPHAGE) 500 MG tablet Take 500 mg by mouth 2 times daily (with meals)      Cholesterol POWD 1 Package by Does not apply route 2 times daily       sertraline (ZOLOFT) 50 MG tablet Take 100 mg by mouth daily ALL MEDS PER PEG TUBE      diphenoxylate-atropine (LOMOTIL) 2.5-0.025 MG per tablet Take 2 tablets by mouth 4 times daily  .  levothyroxine (SYNTHROID) 150 MCG tablet Take 125 mcg by mouth daily        No current facility-administered medications on file prior to encounter. Allergies:  No Known Allergies     Social History:   reports that he quit smoking about 19 years ago. His smoking use included Cigarettes. He has a 60.00 pack-year smoking history. He has quit using smokeless tobacco. His smokeless tobacco use included Snuff.  He reports that he does not

## 2018-01-15 NOTE — PROGRESS NOTES
Patient received to PACU in stable condition. VSS. Denies pain. Small amount of serosanguinous drainage noted to left neck dressing. Ice applied.  Will continue to monitor

## 2018-04-11 PROBLEM — K14.8 MASS OF TONGUE: Status: ACTIVE | Noted: 2018-01-01

## 2018-04-17 PROBLEM — C85.99: Status: ACTIVE | Noted: 2018-01-01

## 2018-05-18 PROBLEM — R50.81 NEUTROPENIC FEVER (HCC): Status: ACTIVE | Noted: 2018-01-01

## 2018-05-18 PROBLEM — D69.6 THROMBOCYTOPENIA (HCC): Status: ACTIVE | Noted: 2018-01-01

## 2018-05-18 PROBLEM — D70.9 NEUTROPENIC FEVER (HCC): Status: ACTIVE | Noted: 2018-01-01

## 2018-06-11 ENCOUNTER — TELEPHONE (OUTPATIENT)
Dept: ENT CLINIC | Age: 68
End: 2018-06-11